# Patient Record
Sex: FEMALE | Race: WHITE | NOT HISPANIC OR LATINO | Employment: FULL TIME | URBAN - METROPOLITAN AREA
[De-identification: names, ages, dates, MRNs, and addresses within clinical notes are randomized per-mention and may not be internally consistent; named-entity substitution may affect disease eponyms.]

---

## 2021-08-16 ENCOUNTER — APPOINTMENT (OUTPATIENT)
Dept: LAB | Facility: HOSPITAL | Age: 26
End: 2021-08-16
Payer: COMMERCIAL

## 2021-08-16 ENCOUNTER — HOSPITAL ENCOUNTER (EMERGENCY)
Facility: HOSPITAL | Age: 26
Discharge: HOME | End: 2021-08-16
Attending: EMERGENCY MEDICINE
Payer: COMMERCIAL

## 2021-08-16 VITALS
OXYGEN SATURATION: 100 % | TEMPERATURE: 97.4 F | DIASTOLIC BLOOD PRESSURE: 51 MMHG | BODY MASS INDEX: 23.59 KG/M2 | SYSTOLIC BLOOD PRESSURE: 100 MMHG | HEIGHT: 60 IN | RESPIRATION RATE: 15 BRPM | HEART RATE: 74 BPM | WEIGHT: 120.15 LBS

## 2021-08-16 DIAGNOSIS — N93.9 VAGINAL BLEEDING: Primary | ICD-10-CM

## 2021-08-16 DIAGNOSIS — N93.9 VAGINAL HEMORRHAGE: Primary | ICD-10-CM

## 2021-08-16 LAB
ABO GROUP BLD: NORMAL
BASOPHILS # BLD AUTO: 0.05 THOUSANDS/ΜL (ref 0–0.1)
BASOPHILS NFR BLD AUTO: 1 % (ref 0–1)
BLD GP AB SCN SERPL QL: POSITIVE
EOSINOPHIL # BLD AUTO: 0.22 THOUSAND/ΜL (ref 0–0.61)
EOSINOPHIL NFR BLD AUTO: 4 % (ref 0–6)
ERYTHROCYTE [DISTWIDTH] IN BLOOD BY AUTOMATED COUNT: 13.6 % (ref 11.6–15.1)
HCT VFR BLD AUTO: 41.3 % (ref 34.8–46.1)
HGB BLD-MCNC: 13 G/DL (ref 11.5–15.4)
IMM GRANULOCYTES # BLD AUTO: 0.02 THOUSAND/UL (ref 0–0.2)
IMM GRANULOCYTES NFR BLD AUTO: 0 % (ref 0–2)
LYMPHOCYTES # BLD AUTO: 1.59 THOUSANDS/ΜL (ref 0.6–4.47)
LYMPHOCYTES NFR BLD AUTO: 26 % (ref 14–44)
MCH RBC QN AUTO: 27.9 PG (ref 26.8–34.3)
MCHC RBC AUTO-ENTMCNC: 31.5 G/DL (ref 31.4–37.4)
MCV RBC AUTO: 89 FL (ref 82–98)
MONOCYTES # BLD AUTO: 0.53 THOUSAND/ΜL (ref 0.17–1.22)
MONOCYTES NFR BLD AUTO: 9 % (ref 4–12)
NEUTROPHILS # BLD AUTO: 3.67 THOUSANDS/ΜL (ref 1.85–7.62)
NEUTS SEG NFR BLD AUTO: 60 % (ref 43–75)
NRBC BLD AUTO-RTO: 0 /100 WBCS
PLATELET # BLD AUTO: 200 THOUSANDS/UL (ref 149–390)
PMV BLD AUTO: 10.8 FL (ref 8.9–12.7)
RBC # BLD AUTO: 4.66 MILLION/UL (ref 3.81–5.12)
RH BLD: NEGATIVE
SPECIMEN EXPIRATION DATE: NORMAL
WBC # BLD AUTO: 6.08 THOUSAND/UL (ref 4.31–10.16)

## 2021-08-16 PROCEDURE — 99282 EMERGENCY DEPT VISIT SF MDM: CPT | Performed by: EMERGENCY MEDICINE

## 2021-08-16 PROCEDURE — 86900 BLOOD TYPING SEROLOGIC ABO: CPT | Performed by: EMERGENCY MEDICINE

## 2021-08-16 PROCEDURE — 36415 COLL VENOUS BLD VENIPUNCTURE: CPT | Performed by: EMERGENCY MEDICINE

## 2021-08-16 PROCEDURE — 99284 EMERGENCY DEPT VISIT MOD MDM: CPT

## 2021-08-16 PROCEDURE — 86870 RBC ANTIBODY IDENTIFICATION: CPT

## 2021-08-16 PROCEDURE — 86850 RBC ANTIBODY SCREEN: CPT | Performed by: EMERGENCY MEDICINE

## 2021-08-16 PROCEDURE — 85025 COMPLETE CBC W/AUTO DIFF WBC: CPT | Performed by: EMERGENCY MEDICINE

## 2021-08-16 PROCEDURE — 86901 BLOOD TYPING SEROLOGIC RH(D): CPT | Performed by: EMERGENCY MEDICINE

## 2021-08-16 RX ORDER — ACETAMINOPHEN 325 MG/1
975 TABLET ORAL ONCE
Status: COMPLETED | OUTPATIENT
Start: 2021-08-16 | End: 2021-08-16

## 2021-08-16 RX ADMIN — ACETAMINOPHEN 975 MG: 325 TABLET, FILM COATED ORAL at 17:04

## 2021-08-16 NOTE — ED PROVIDER NOTES
History  Chief Complaint   Patient presents with    Vaginal Bleeding     pt states she started her menstrual cycle on Saturday after having baby 7 wks ago, heaving bleeding started yesterday, feeling tired and weakness     50-year-old female presents for evaluation of vaginal bleeding  Patient is  recently gave birth on 2021 via spontaneous vaginal delivery  Patient follows with [facility]  Patient reports she was bleeding for about 3 weeks after delivery however reports on Saturday she started to rebleed  Yesterday evening bleeding started to intensify  Patient reports she is going through 1 regular-sized pad about every 2 hours  She does have an abdominal cramping sensation similar to menstrual cramps, she has not taken anything for pain at home  Patient reports she is not breastfeeding  She denies any other vaginal discharge, dysuria, increased frequency  Patient reports using tampons over the weekend however denies any other vaginal insertions or trauma  Patient admits to an intermittent sensation of lightheadedness without syncope  She denies headaches, chest pain, dyspnea, nausea, vomiting  She has been eating and drinking her usual self  She did not call her OBGYN, states she has follow-up appointment on Thursday for 6 week postpartum  None       History reviewed  No pertinent past medical history  History reviewed  No pertinent surgical history  History reviewed  No pertinent family history  I have reviewed and agree with the history as documented      E-Cigarette/Vaping    E-Cigarette Use Never User      E-Cigarette/Vaping Substances     Social History     Tobacco Use    Smoking status: Current Every Day Smoker     Packs/day: 0 50    Smokeless tobacco: Never Used   Vaping Use    Vaping Use: Never used   Substance Use Topics    Alcohol use: Yes     Comment: occasional    Drug use: Never       Review of Systems   Constitutional: Negative for appetite change, chills and fever  Respiratory: Negative for shortness of breath  Cardiovascular: Negative for chest pain  Gastrointestinal: Negative for abdominal pain, nausea and vomiting  Genitourinary: Positive for menstrual problem, pelvic pain and vaginal bleeding  Negative for dysuria, vaginal discharge and vaginal pain  Neurological: Positive for light-headedness  Negative for syncope and headaches  All other systems reviewed and are negative  Physical Exam  Physical Exam  Vitals reviewed  Constitutional:       General: She is not in acute distress  Appearance: Normal appearance  She is not ill-appearing, toxic-appearing or diaphoretic  HENT:      Head: Normocephalic and atraumatic  Right Ear: External ear normal       Left Ear: External ear normal    Eyes:      General:         Right eye: No discharge  Left eye: No discharge  Conjunctiva/sclera: Conjunctivae normal    Cardiovascular:      Rate and Rhythm: Normal rate  Pulmonary:      Effort: Pulmonary effort is normal  No respiratory distress  Breath sounds: Normal breath sounds  Abdominal:      General: There is no distension  Palpations: Abdomen is soft  Tenderness: There is no abdominal tenderness  There is no guarding or rebound  Musculoskeletal:         General: No deformity or signs of injury  Skin:     General: Skin is warm  Coloration: Skin is not jaundiced or pale  Neurological:      General: No focal deficit present  Mental Status: She is alert  Mental status is at baseline           Vital Signs  ED Triage Vitals [08/16/21 1429]   Temperature Pulse Respirations Blood Pressure SpO2   (!) 97 4 °F (36 3 °C) 74 18 108/74 100 %      Temp Source Heart Rate Source Patient Position - Orthostatic VS BP Location FiO2 (%)   Temporal Monitor Lying Right arm --      Pain Score       7           Vitals:    08/16/21 1429 08/16/21 1645   BP: 108/74 100/51   Pulse: 74    Patient Position - Orthostatic VS: Lying          Visual Acuity      ED Medications  Medications   acetaminophen (TYLENOL) tablet 975 mg (975 mg Oral Given 8/16/21 1704)       Diagnostic Studies  Results Reviewed     Procedure Component Value Units Date/Time    CBC and differential [[id number]] Collected: 08/16/21 1634    Lab Status: Final result Specimen: Blood from Arm, Right Updated: 08/16/21 1640     WBC 6 08 Thousand/uL      RBC 4 66 Million/uL      Hemoglobin 13 0 g/dL      Hematocrit 41 3 %      MCV 89 fL      MCH 27 9 pg      MCHC 31 5 g/dL      RDW 13 6 %      MPV 10 8 fL      Platelets 762 Thousands/uL      nRBC 0 /100 WBCs      Neutrophils Relative 60 %      Immat GRANS % 0 %      Lymphocytes Relative 26 %      Monocytes Relative 9 %      Eosinophils Relative 4 %      Basophils Relative 1 %      Neutrophils Absolute 3 67 Thousands/µL      Immature Grans Absolute 0 02 Thousand/uL      Lymphocytes Absolute 1 59 Thousands/µL      Monocytes Absolute 0 53 Thousand/µL      Eosinophils Absolute 0 22 Thousand/µL      Basophils Absolute 0 05 Thousands/µL                  No orders to display              Procedures  Procedures         ED Course  ED Course as of Aug 17 0043   Mon Aug 16, 2021   1603 Ambulatory to room without assistance      1649 Hgb 10 1 6/26/21   Hemoglobin: 13 0                                           MDM  Number of Diagnoses or Management Options  Vaginal bleeding  Diagnosis management comments: 58-year-old female presents for evaluation of vaginal bleeding  On examination patient is well appearing with normal vital signs  Will check patient's CBC as well  type and screen  Patient is likely experiencing her 1st menstrual cycle after pregnancy  Patient will likely be able to discharge home with follow-up with her OBGYN tomorrow        Disposition  Final diagnoses:   Vaginal bleeding     Time reflects when diagnosis was documented in both MDM as applicable and the Disposition within this note     Time User Action Codes Description Comment    8/16/2021  4:50 PM Jerry Reid Add [N93 9] Vaginal bleeding       ED Disposition     ED Disposition Condition Date/Time Comment    Discharge Stable Mon Aug 16, 2021  4:50 PM Nancy Marcano discharge to home/self care  Follow-up Information     Follow up With Specialties Details Why Contact Info    OBGYN  Call   [address] [facility] [address]    [address] [facility]    [address]    [address] [place] [address], [address] [facility]   [phone]          There are no discharge medications for this patient  No discharge procedures on file      PDMP Review     None          ED Provider  Electronically Signed by           Perfecto Hannah DO  08/17/21 8280

## 2021-08-16 NOTE — Clinical Note
Ella Willis was seen and treated in our emergency department on 8/16/2021  Diagnosis: Dysfunctioal bleeding    Sandy Sun  may return to work on return date  She may return on this date: 08/18/2021         If you have any questions or concerns, please don't hesitate to call        Leonila Sunshine, DO    ______________________________           _______________          _______________  Hospital Representative                              Date                                Time

## 2021-08-17 LAB — BLOOD GROUP ANTIBODIES SERPL: NORMAL

## 2024-02-13 ENCOUNTER — HOSPITAL ENCOUNTER (EMERGENCY)
Facility: HOSPITAL | Age: 29
Discharge: HOME | End: 2024-02-13
Attending: EMERGENCY MEDICINE | Admitting: EMERGENCY MEDICINE
Payer: COMMERCIAL

## 2024-02-13 VITALS
TEMPERATURE: 98.2 F | DIASTOLIC BLOOD PRESSURE: 73 MMHG | RESPIRATION RATE: 15 BRPM | SYSTOLIC BLOOD PRESSURE: 118 MMHG | HEART RATE: 89 BPM | OXYGEN SATURATION: 98 %

## 2024-02-13 DIAGNOSIS — R68.89 FLU-LIKE SYMPTOMS: Primary | ICD-10-CM

## 2024-02-13 LAB
FLUAV RNA RESP QL NAA+PROBE: NEGATIVE
FLUBV RNA RESP QL NAA+PROBE: POSITIVE
RSV RNA RESP QL NAA+PROBE: NEGATIVE
S PYO DNA THROAT QL NAA+PROBE: NOT DETECTED
SARS-COV-2 RNA RESP QL NAA+PROBE: NEGATIVE

## 2024-02-13 PROCEDURE — 0241U HB NFCT DS VIR RESP RNA 4 TRGT: CPT | Performed by: EMERGENCY MEDICINE

## 2024-02-13 PROCEDURE — 87651 STREP A DNA AMP PROBE: CPT | Performed by: EMERGENCY MEDICINE

## 2024-02-13 PROCEDURE — 99284 EMERGENCY DEPT VISIT MOD MDM: CPT | Performed by: EMERGENCY MEDICINE

## 2024-02-13 PROCEDURE — 99283 EMERGENCY DEPT VISIT LOW MDM: CPT

## 2024-02-13 RX ORDER — ONDANSETRON 4 MG/1
4 TABLET, ORALLY DISINTEGRATING ORAL EVERY 8 HOURS PRN
Qty: 20 TABLET | Refills: 0 | Status: SHIPPED | OUTPATIENT
Start: 2024-02-13

## 2024-02-13 RX ORDER — ALBUTEROL SULFATE 90 UG/1
2 AEROSOL, METERED RESPIRATORY (INHALATION) ONCE
Status: COMPLETED | OUTPATIENT
Start: 2024-02-13 | End: 2024-02-13

## 2024-02-13 RX ORDER — ONDANSETRON 4 MG/1
4 TABLET, ORALLY DISINTEGRATING ORAL ONCE
Status: COMPLETED | OUTPATIENT
Start: 2024-02-13 | End: 2024-02-13

## 2024-02-13 RX ADMIN — ONDANSETRON 4 MG: 4 TABLET, ORALLY DISINTEGRATING ORAL at 17:46

## 2024-02-13 RX ADMIN — ALBUTEROL SULFATE 2 PUFF: 90 AEROSOL, METERED RESPIRATORY (INHALATION) at 17:46

## 2024-02-13 NOTE — DISCHARGE INSTRUCTIONS
Zofran as needed for nausea  Plenty of fluids   Can check my chart for covid/influenza/rsv and strep result - antibiotics will be called in if strep is postivie  Albuterol MDI 2 puffs every 4 hours as needed for cough  Aleve 2 tabs twice daily with food OR ibuprofen 200-800mg every 8 hours with food as needed for pain (if not pregnant)  Tylenol 650mg every 6 hours as needed for pain, fever (max 3000mg in 24 hours)   Return with increased shortness of breath, chest pain inability to keep fluids down needed MDI more ofthen than every 4 hours or any new or worsening symptoms  If covid/influenza/rsv positive avoid anyone who has a compromised immune system eg infants pregnant women those on chemotherapy tranplant patients or those on continuous oxygen